# Patient Record
Sex: FEMALE | Race: WHITE | NOT HISPANIC OR LATINO | Employment: OTHER | URBAN - METROPOLITAN AREA
[De-identification: names, ages, dates, MRNs, and addresses within clinical notes are randomized per-mention and may not be internally consistent; named-entity substitution may affect disease eponyms.]

---

## 2021-05-25 ENCOUNTER — RECORDS - HEALTHEAST (OUTPATIENT)
Dept: ADMINISTRATIVE | Facility: CLINIC | Age: 61
End: 2021-05-25

## 2024-09-26 ENCOUNTER — TRANSCRIBE ORDERS (OUTPATIENT)
Dept: OTHER | Age: 64
End: 2024-09-26

## 2024-09-26 DIAGNOSIS — M54.2 CERVICAL PAIN: ICD-10-CM

## 2024-09-26 DIAGNOSIS — G89.29 CHRONIC RIGHT SHOULDER PAIN: ICD-10-CM

## 2024-09-26 DIAGNOSIS — R26.89 BALANCE PROBLEM: ICD-10-CM

## 2024-09-26 DIAGNOSIS — M25.511 CHRONIC RIGHT SHOULDER PAIN: ICD-10-CM

## 2024-09-26 DIAGNOSIS — H81.90 VESTIBULAR DIZZINESS: Primary | ICD-10-CM

## 2025-01-20 NOTE — TELEPHONE ENCOUNTER
Action    Action Taken Request sent to [facility] for imaging.    Request sent to eliel for records      NEUROLOGY PRE- VISIT      RECORDS RECEIVED FROM: Referred by Dr. Shannon Spain, [facility] notes in chart   REASON FOR VISIT: Dizziness/balance issues   PROVIDER: Johnna   DATE OF APPT: 01/28/2025   NOTES (FOR ALL VISITS) STATUS DETAILS   OFFICE NOTE from referring provider Internal Referral and notes in chart   OFFICE NOTE from other specialist waiting Prev seen at [facility]   DISCHARGE SUMMARY from hospital N/A    DISCHARGE REPORT from ER N/A    EEG N/A    EMG REPORT N/A         IMAGING  (FOR ALL VISITS)     MRI (HEAD, NECK, SPINE) received    received MR head 03/10/2022 w/[facility]    MRI Lumbar 09/05/2024 w/[facility]   CT (HEAD, NECK, SPINE) N/A    XR  (HEAD, NECK, SPINE) received XR Cervical 08/21/2024 w/[facility]

## 2025-01-24 ASSESSMENT — PATIENT HEALTH QUESTIONNAIRE - PHQ9
SUM OF ALL RESPONSES TO PHQ QUESTIONS 1-9: 7
10. IF YOU CHECKED OFF ANY PROBLEMS, HOW DIFFICULT HAVE THESE PROBLEMS MADE IT FOR YOU TO DO YOUR WORK, TAKE CARE OF THINGS AT HOME, OR GET ALONG WITH OTHER PEOPLE: SOMEWHAT DIFFICULT
SUM OF ALL RESPONSES TO PHQ QUESTIONS 1-9: 7

## 2025-01-27 NOTE — PROGRESS NOTES
INITIAL NEUROLOGY CONSULTATION    DATE OF VISIT: 1/28/2025  CLINIC LOCATION: [facility]  MRN: [id number]  PATIENT NAME: Shala Foster  YOB: 1960    REASON FOR VISIT: No chief complaint on file.    HISTORY OF PRESENT ILLNESS:                                                    Ms. Shala Foster is 64 year old right handed female patient with past medical history of asthma, anxiety, B12 deficiency, celiac disease,, who was seen today for balance.    Per patient's report, symptoms started about 3 years ago.  She developed difficulty with balance/unsteadiness, earaches, headaches, and neck pain.  Normal conversation, stores, cars, restaurants and household activities or any physical activity could worsen her symptoms.  Sleep, prone position, and low sensory situation help the symptoms.  She tried vestibular therapy for a year at [facility] without improvement.    According to scanned report from [facility], the patient was seen in June 2023 regarding vestibular migraines that started around December 2021 after fall.  It felt that her clinical presentation is consistent with vestibular migraine with superimposed medication overuse headaches.  Medication changes were suggested, including switching her antidepressant to nortriptyline and trial of Imitrex.  The patient also reported chronic lightheadedness, previously evaluated by Dr. Hernandez in 2017 with extensive negative workup.  At that time MRI of the brain, EMG, and 3-hour video EEG monitoring for negative.  Presentation felt suspicious for functional neurologic disorder.  Biofeedback and cognitive behavioral therapy were advised.    She reports positive family history of stroke and peripheral polyneuropathy.    According to [facility] Every[facility], laboratory evaluation from July 2024 demonstrated normal CBC, unremarkable CMP, negative C-reactive protein, normal TSH (1.03), and elevated LDL of 136.    Brain MRI  with and without contrast from 12/27/2022 demonstrated mild age-related changes, but was negative for abnormalities of internal auditory canals and labyrinthine structures.  PAST MEDICAL/SURGICAL HISTORY:                                                    I personally reviewed patient's past medical and surgical history with the patient at today's visit.  MEDICATIONS:                                                    I personally reviewed patient's medications and allergies with the patient at today's visit.  ALLERGIES:                                                    Not on File  EXAM:                                                    VITAL SIGNS:   There were no vitals taken for this visit.  Mini-Cog Assessment:       General: pt is in NAD, cooperative.  Skin: normal turgor, moist mucous membranes, no lesions/rashes noticed.  HEENT: ATNC, EOMI, PERRL, white sclera, normal conjunctiva, no nystagmus or ptosis. No carotid bruits bilaterally.  Respiratory: lung sounds clear to auscultation bilaterally, no crackles, wheezes, rhonchi. Symmetric lung excursion, no accessory respiratory muscle use.  Cardiovascular: normal S1/S2, no murmurs/rubs/gallops.   Abdomen: Not distended.  : deferred.    Neurological:  Mental: alert, follows commands,  /5 with ***/3 on memory recall, no aphasia or dysarthria. Fund of knowledge is {MYAPPROPRIATE:768177}  Cranial Nerves:  CN II: visual acuity - able to accurately count fingers with each eye. Visual fields intact, fundi: discs sharp, no papilledema and normal vessels bilaterally.  CN III, IV, VI: EOM intact, pupils equal and reactive  CN V: facial sensation nl  CN VII: face symmetric, no facial droop  CN VIII: hearing normal  CN IX: palate elevation symmetric, uvula at midline  CN XI SCM normal, shoulder shrug nl  CN XII: tongue midline  Motor: Strength: 5/5 in all major groups of all extremities. Normal tone. No abnormal movements. No pronator drift b/l.  Reflexes: Triceps,  biceps, brachioradialis, patellar, and achilles reflexes normal and symmetric. No clonus noted. Toes are down-going b/l.   Sensory: light touch, pinprick, and vibration intact. Romberg: negative.  Coordination: FNF and heel-shin tests intact b/l.   Gait:  Normal, able to tandem walk *** without difficulty.  DATA:   LABS/EEG/IMAGING/OTHER STUDIES: I reviewed pertinent medical records, as detailed in the history of present illness.  ASSESSMENT AND PLAN:      ASSESSMENT: Shala Foster is a 64 year old female patient with listed above past medical history, who presents with ***.    We had a detailed discussion with the patient regarding her presenting complaints.  The neurological exam today is ***.    DIAGNOSES:  No diagnosis found.  PLAN: There are no Patient Instructions on file for this visit.    Total Time: *** minutes spent on the date of the encounter doing chart review, history and exam, documentation and further activities per the note.    Matti Oropeza MD  [facility]  (Chart documentation was completed in part with Dragon voice-recognition software. Even though reviewed, some grammatical, spelling, and word errors may remain.)

## 2025-01-28 ENCOUNTER — OFFICE VISIT (OUTPATIENT)
Dept: NEUROLOGY | Facility: CLINIC | Age: 65
End: 2025-01-28
Payer: COMMERCIAL

## 2025-01-28 ENCOUNTER — PRE VISIT (OUTPATIENT)
Dept: NEUROLOGY | Facility: CLINIC | Age: 65
End: 2025-01-28

## 2025-01-28 VITALS
WEIGHT: 179 LBS | HEIGHT: 66 IN | BODY MASS INDEX: 28.77 KG/M2 | DIASTOLIC BLOOD PRESSURE: 85 MMHG | SYSTOLIC BLOOD PRESSURE: 125 MMHG | HEART RATE: 59 BPM | OXYGEN SATURATION: 96 %

## 2025-01-28 DIAGNOSIS — R42 MAL DE DEBARQUEMENT: Primary | ICD-10-CM

## 2025-01-28 PROCEDURE — 99417 PROLNG OP E/M EACH 15 MIN: CPT | Performed by: PSYCHIATRY & NEUROLOGY

## 2025-01-28 PROCEDURE — 99205 OFFICE O/P NEW HI 60 MIN: CPT | Performed by: PSYCHIATRY & NEUROLOGY

## 2025-01-28 RX ORDER — CLINDAMYCIN PHOSPHATE 10 UG/ML
LOTION TOPICAL
COMMUNITY
Start: 2024-12-04

## 2025-01-28 RX ORDER — ESTRADIOL 0.5 MG/1
TABLET ORAL
COMMUNITY
Start: 2024-04-21

## 2025-01-28 RX ORDER — CETIRIZINE HYDROCHLORIDE 10 MG/1
TABLET ORAL
COMMUNITY

## 2025-01-28 RX ORDER — PROGESTERONE 100 MG/1
100 CAPSULE ORAL DAILY
COMMUNITY
Start: 2024-07-03

## 2025-01-28 RX ORDER — AMPICILLIN TRIHYDRATE 500 MG
1000 CAPSULE ORAL
COMMUNITY

## 2025-01-28 RX ORDER — ALBUTEROL SULFATE 90 UG/1
1-2 INHALANT RESPIRATORY (INHALATION)
COMMUNITY
Start: 2024-01-25

## 2025-01-28 RX ORDER — ZOLPIDEM TARTRATE 10 MG/1
10 TABLET ORAL
COMMUNITY
Start: 2023-10-09

## 2025-01-28 NOTE — LETTER
"1/28/2025      Shala Foster  [address] [address]      Dear Colleague,    Thank you for referring your patient, Shala Foster, to the [facility]UROLOGY [facility]. Please see a copy of my visit note below.    INITIAL NEUROLOGY CONSULTATION    DATE OF VISIT: 1/28/2025  CLINIC LOCATION: [facility]  MRN: [id number]  PATIENT NAME: Shala Foster  YOB: 1960    REASON FOR VISIT:   Chief Complaint   Patient presents with     Consult     Unsteadiness/Balance- no spinning like vertigo, just feels like she has been drinking. Also has headache pain/pressure      HISTORY OF PRESENT ILLNESS:                                                    Ms. Shala Foster is 64 year old right handed female patient with past medical history of asthma, anxiety, B12 deficiency, and celiac disease, who was seen today for balance difficulty.    Per patient's report, symptoms started about 3 years ago (in 2022) when she woke up one morning with symptoms.  She developed difficulty with balance/unsteadiness, lightheadedness, earaches, headaches, and neck pain.  Was unable to drive.  Approximately 4 weeks prior to the symptoms onset had a bad fall with shoulder dislocation.  Was evaluated by neurologist (see below), but no abnormalities were found.  Never fully recovered.    At the present time, she constantly feels off, like she \"had 10 drinks\".  She is unsteady and woozy/lightheaded that at times lead to falls. She is not able to walk across the room or downstairs carrying items in her hands.  It also affects her decision-making.  She takes frequent naps during the day and sleeps a lot at night.  Normal conversation, stores, cars, restaurants and household activities or any physical activity could worsen her symptoms.  Sleep, prone position, and low sensory stimulation help the symptoms.  She tried vestibular therapy for a year at [facility] without " notable improvement.  Felt to have right vestibular hypofunction according to ENT notes from 2023.    According to scanned report from [facility], the patient was seen in June 2023 regarding vestibular migraines that started around December 2021 after a fall.  It felt that her clinical presentation is consistent with vestibular migraine with superimposed medication overuse headaches.  Medication changes were suggested, including switching her antidepressant to nortriptyline and trial of Imitrex.  The patient also reported chronic lightheadedness, previously evaluated by Dr. Hernandez in 2022 with extensive negative workup.  At that time MRI of the brain, EMG, and 3-hour video EEG monitoring were negative.  Presentation felt suspicious for functional neurologic disorder.  Biofeedback and cognitive behavioral therapy were advised.    She reports positive family history of stroke and peripheral polyneuropathy.    According to [facility], laboratory evaluation from July 2024 demonstrated normal CBC, unremarkable CMP, negative C-reactive protein, normal TSH (1.03), and elevated LDL of 136.    Brain MRI with and without contrast from 12/27/2022 demonstrated mild age-related changes, but was negative for abnormalities of internal auditory canals and labyrinthine structures. Cervical spine MRI from 9/5/2024 ([facility]) demonstrated multilevel degenerative changes without significant degree of spinal canal or neuroforaminal stenosis.  I was able to review images in PACS and independently interpreted them.  PAST MEDICAL/SURGICAL HISTORY:                                                    I personally reviewed patient's past medical and surgical history with the patient at today's visit.  MEDICATIONS:                                                    I personally reviewed patient's medications and allergies with the patient at today's visit.  ALLERGIES:                                                   "    Allergies   Allergen Reactions     Gluten Meal GI Disturbance     Has celiac sprue     Oxycodone Itching     Sulfa Antibiotics Hives and Itching     Valacyclovir Headache     EXAM:                                                    VITAL SIGNS:   /78 (BP Location: Left arm, Patient Position: Supine, Cuff Size: Adult Regular)   Pulse 59   Ht 1.664 m (5' 5.5\")   Wt 81.2 kg (179 lb)   SpO2 99%   BMI 29.33 kg/m    Orthostatic vital signs:  Vitals:    01/28/25 1323 01/28/25 1332   BP: 137/78 125/85   BP Location: Left arm Left arm   Patient Position: Supine Standing   Cuff Size: Adult Regular Adult Regular   Pulse: 59 59   SpO2: 99% 96%   Weight: 81.2 kg (179 lb)    Height: 1.664 m (5' 5.5\")      Mini-Cog Assessment:  Mini Cog Assessment  Clock Draw Score: 2 Normal  3 Item Recall: 3 objects recalled  Mini Cog Total Score: 5  Administered by: : Jennifer MAURICE    General: pt is in NAD, cooperative.  Skin: normal turgor, moist mucous membranes, no lesions/rashes noticed.  HEENT: ATNC, EOMI, PERRL, white sclera, normal conjunctiva, no nystagmus or ptosis. No carotid bruits bilaterally.  Respiratory: lung sounds clear to auscultation bilaterally, no crackles, wheezes, rhonchi. Symmetric lung excursion, no accessory respiratory muscle use.  Cardiovascular: normal S1/S2, no murmurs/rubs/gallops.   Abdomen: Not distended.  : deferred.    Neurological:  Mental: alert, follows commands, Mini Cog Total Score: 5/5 with 3/3 on memory recall, no aphasia or dysarthria. Fund of knowledge is appropriate for age.  Cranial Nerves:  CN II: visual acuity - able to accurately count fingers with each eye. Visual fields intact, fundi: discs sharp, no papilledema and normal vessels bilaterally.  CN III, IV, VI: EOM intact, pupils equal and reactive  CN V: facial sensation nl  CN VII: face symmetric, no facial droop  CN VIII: hearing normal.  Antonino-Hallpike maneuvers are negative bilaterally, so as head impulse tests.  CN IX: palate " elevation symmetric, uvula at midline  CN XI SCM normal, shoulder shrug nl  CN XII: tongue midline  Motor: Strength: 5/5 in all major groups of all extremities. Normal tone. No abnormal movements. No pronator drift b/l.  Reflexes: Triceps, biceps, brachioradialis, patellar, and achilles reflexes normal and symmetric. No clonus noted. Toes are down-going b/l.   Sensory: light touch, pinprick, and vibration intact.   Coordination: FNF and heel-shin tests intact b/l.   Gait: Astasia-abasia.  DATA:   LABS/EEG/IMAGING/OTHER STUDIES: I reviewed pertinent medical records, as detailed in the history of present illness.  ASSESSMENT AND PLAN:      ASSESSMENT: Shala Foster is a 64 year old female patient with listed above past medical history, who presents with chronic vestibular/balance difficulty since 2022.    We had a detailed discussion with the patient regarding her presenting complaints.  Orthostatic testing was negative.  The neurological exam today is noticeable for astasia-abasia without additional focal findings.  Her bedside vestibular testing is negative.  I reviewed her cervical spine MRI and brain MRI in PACS.      We discussed that one of the possibilities, which is quite rare, would be mal de debarquement syndrome, though some features of her stance and gait are suggestive of functional neurologic disorder.  We reviewed the diagnosis, available treatment options, and the plan, as summarized below.  Decided to try a course of physical therapy.  The patient was hesitant to add any new medications, though we discussed option of low-dose of clonazepam.    DIAGNOSES:    ICD-10-CM    1. Mal de debarquement  R42 Physical Therapy  Referral        PLAN: At today's visit we thoroughly discussed diagnostic possibilities for current symptoms, available treatment options, and the plan, which includes:  Orders Placed This Encounter   Procedures     Physical Therapy  Referral     No new  medications.    Next follow-up appointment is on as needed basis.    Total Time: 77 minutes spent on the date of the encounter doing chart review, history and exam, documentation and further activities per the note.  Additional time was needed to review prior notes/patient's chart, her symptoms, performed evaluation/results, my impression, available treatment options, and the plan while answering patient's questions.    Matti Oropeza MD  [facility]  (Chart documentation was completed in part with Dragon voice-recognition software. Even though reviewed, some grammatical, spelling, and word errors may remain.)            Again, thank you for allowing me to participate in the care of your patient.        Sincerely,        Matti Oropeza MD    Electronically signed

## 2025-01-28 NOTE — PROGRESS NOTES
"INITIAL NEUROLOGY CONSULTATION    DATE OF VISIT: 1/28/2025  CLINIC LOCATION: [facility]  MRN: [id number]  PATIENT NAME: Shala Foster  YOB: 1960    REASON FOR VISIT:   Chief Complaint   Patient presents with    Consult     Unsteadiness/Balance- no spinning like vertigo, just feels like she has been drinking. Also has headache pain/pressure      HISTORY OF PRESENT ILLNESS:                                                    Ms. Shala Foster is 64 year old right handed female patient with past medical history of asthma, anxiety, B12 deficiency, and celiac disease, who was seen today for balance difficulty.    Per patient's report, symptoms started about 3 years ago (in 2022) when she woke up one morning with symptoms.  She developed difficulty with balance/unsteadiness, lightheadedness, earaches, headaches, and neck pain.  Was unable to drive.  Approximately 4 weeks prior to the symptoms onset had a bad fall with shoulder dislocation.  Was evaluated by neurologist (see below), but no abnormalities were found.  Never fully recovered.    At the present time, she constantly feels off, like she \"had 10 drinks\".  She is unsteady and woozy/lightheaded that at times lead to falls. She is not able to walk across the room or downstairs carrying items in her hands.  It also affects her decision-making.  She takes frequent naps during the day and sleeps a lot at night.  Normal conversation, stores, cars, restaurants and household activities or any physical activity could worsen her symptoms.  Sleep, prone position, and low sensory stimulation help the symptoms.  She tried vestibular therapy for a year at [facility] without notable improvement.  Felt to have right vestibular hypofunction according to ENT notes from 2023.    According to scanned report from [facility], the patient was seen in June 2023 regarding vestibular migraines that started around December " 2021 after a fall.  It felt that her clinical presentation is consistent with vestibular migraine with superimposed medication overuse headaches.  Medication changes were suggested, including switching her antidepressant to nortriptyline and trial of Imitrex.  The patient also reported chronic lightheadedness, previously evaluated by Dr. Hernandez in 2022 with extensive negative workup.  At that time MRI of the brain, EMG, and 3-hour video EEG monitoring were negative.  Presentation felt suspicious for functional neurologic disorder.  Biofeedback and cognitive behavioral therapy were advised.    She reports positive family history of stroke and peripheral polyneuropathy.    According to [facility], laboratory evaluation from July 2024 demonstrated normal CBC, unremarkable CMP, negative C-reactive protein, normal TSH (1.03), and elevated LDL of 136.    Brain MRI with and without contrast from 12/27/2022 demonstrated mild age-related changes, but was negative for abnormalities of internal auditory canals and labyrinthine structures. Cervical spine MRI from 9/5/2024 ([facility]) demonstrated multilevel degenerative changes without significant degree of spinal canal or neuroforaminal stenosis.  I was able to review images in PACS and independently interpreted them.  PAST MEDICAL/SURGICAL HISTORY:                                                    I personally reviewed patient's past medical and surgical history with the patient at today's visit.  MEDICATIONS:                                                    I personally reviewed patient's medications and allergies with the patient at today's visit.  ALLERGIES:                                                      Allergies   Allergen Reactions    Gluten Meal GI Disturbance     Has celiac sprue    Oxycodone Itching    Sulfa Antibiotics Hives and Itching    Valacyclovir Headache     EXAM:                                                    VITAL SIGNS:   /78  "(BP Location: Left arm, Patient Position: Supine, Cuff Size: Adult Regular)   Pulse 59   Ht 1.664 m (5' 5.5\")   Wt 81.2 kg (179 lb)   SpO2 99%   BMI 29.33 kg/m    Orthostatic vital signs:  Vitals:    01/28/25 1323 01/28/25 1332   BP: 137/78 125/85   BP Location: Left arm Left arm   Patient Position: Supine Standing   Cuff Size: Adult Regular Adult Regular   Pulse: 59 59   SpO2: 99% 96%   Weight: 81.2 kg (179 lb)    Height: 1.664 m (5' 5.5\")      Mini-Cog Assessment:  Mini Cog Assessment  Clock Draw Score: 2 Normal  3 Item Recall: 3 objects recalled  Mini Cog Total Score: 5  Administered by: : Jennifer MAURICE    General: pt is in NAD, cooperative.  Skin: normal turgor, moist mucous membranes, no lesions/rashes noticed.  HEENT: ATNC, EOMI, PERRL, white sclera, normal conjunctiva, no nystagmus or ptosis. No carotid bruits bilaterally.  Respiratory: lung sounds clear to auscultation bilaterally, no crackles, wheezes, rhonchi. Symmetric lung excursion, no accessory respiratory muscle use.  Cardiovascular: normal S1/S2, no murmurs/rubs/gallops.   Abdomen: Not distended.  : deferred.    Neurological:  Mental: alert, follows commands, Mini Cog Total Score: 5/5 with 3/3 on memory recall, no aphasia or dysarthria. Fund of knowledge is appropriate for age.  Cranial Nerves:  CN II: visual acuity - able to accurately count fingers with each eye. Visual fields intact, fundi: discs sharp, no papilledema and normal vessels bilaterally.  CN III, IV, VI: EOM intact, pupils equal and reactive  CN V: facial sensation nl  CN VII: face symmetric, no facial droop  CN VIII: hearing normal.  Antonino-Hallpike maneuvers are negative bilaterally, so as head impulse tests.  CN IX: palate elevation symmetric, uvula at midline  CN XI SCM normal, shoulder shrug nl  CN XII: tongue midline  Motor: Strength: 5/5 in all major groups of all extremities. Normal tone. No abnormal movements. No pronator drift b/l.  Reflexes: Triceps, biceps, brachioradialis, " patellar, and achilles reflexes normal and symmetric. No clonus noted. Toes are down-going b/l.   Sensory: light touch, pinprick, and vibration intact.   Coordination: FNF and heel-shin tests intact b/l.   Gait: Astasia-abasia.  DATA:   LABS/EEG/IMAGING/OTHER STUDIES: I reviewed pertinent medical records, as detailed in the history of present illness.  ASSESSMENT AND PLAN:      ASSESSMENT: Shala Foster is a 64 year old female patient with listed above past medical history, who presents with chronic vestibular/balance difficulty since 2022.    We had a detailed discussion with the patient regarding her presenting complaints.  Orthostatic testing was negative.  The neurological exam today is noticeable for astasia-abasia without additional focal findings.  Her bedside vestibular testing is negative.  I reviewed her cervical spine MRI and brain MRI in PACS.      We discussed that one of the possibilities, which is quite rare, would be mal de debarquement syndrome, though some features of her stance and gait are suggestive of functional neurologic disorder.  We reviewed the diagnosis, available treatment options, and the plan, as summarized below.  Decided to try a course of physical therapy.  The patient was hesitant to add any new medications, though we discussed option of low-dose of clonazepam.    DIAGNOSES:    ICD-10-CM    1. Mal de debarquement  R42 Physical Therapy  Referral        PLAN: At today's visit we thoroughly discussed diagnostic possibilities for current symptoms, available treatment options, and the plan, which includes:  Orders Placed This Encounter   Procedures    Physical Therapy  Referral     No new medications.    Next follow-up appointment is on as needed basis.    Total Time: 77 minutes spent on the date of the encounter doing chart review, history and exam, documentation and further activities per the note.  Additional time was needed to review prior notes/patient's chart,  her symptoms, performed evaluation/results, my impression, available treatment options, and the plan while answering patient's questions.    Matti Oropeza MD  [facility]  (Chart documentation was completed in part with Dragon voice-recognition software. Even though reviewed, some grammatical, spelling, and word errors may remain.)

## 2025-01-28 NOTE — PATIENT INSTRUCTIONS
AFTER VISIT SUMMARY (AVS):    At today's visit we thoroughly discussed diagnostic possibilities for current symptoms, available treatment options, and the plan, which includes:  Orders Placed This Encounter   Procedures    Physical Therapy  Referral     No new medications.    Next follow-up appointment is on as needed basis.    Please do not hesitate to call me with any questions or concerns.    Thanks.